# Patient Record
Sex: FEMALE | Race: WHITE | URBAN - NONMETROPOLITAN AREA
[De-identification: names, ages, dates, MRNs, and addresses within clinical notes are randomized per-mention and may not be internally consistent; named-entity substitution may affect disease eponyms.]

---

## 2017-01-27 PROBLEM — H40.013: Noted: 2017-01-27

## 2017-01-27 PROBLEM — H35.3221: Noted: 2017-01-27

## 2017-08-11 NOTE — PATIENT DISCUSSION
(C46.8549) Primary open-angle glaucoma right eye moderate stage - Assesment : Examination revealed Primary Open Angle Glaucoma. History of four SLT procedures OD and three SLT procedures OS, s/p YAG PI OU superiorly and patent today. Most recently Trabeculectomy OU. Blebs appear good today. - Plan : Monitor for changes. Advised patient to call our office when decreased vision or increased eye pain. Patient is currently on Latanoprost OU QHS, consider switching to Lumigan OU QHS to achieve better IOP control. Patient to begin using Lumigan OD QHS then RV 1 Month for Tension Check and VF 24-2.

## 2017-08-11 NOTE — PATIENT DISCUSSION
(H25.012) Cortical age-related cataract, left eye - Assesment : Examination revealed Cortical Senile Cataract with Pseudoexfoliation OS. Mild visual symptoms and okay to hold off on cat sx at this time. - Plan : Monitor for changes. Advised patient to call our office with decreased vision or increased symptoms. Explanation given. Patient to begin using Lumigan OD QHS then RV 1 Month for Tension Check and VF 24-2.

## 2017-08-11 NOTE — PATIENT DISCUSSION
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 Mild OD. - Plan : Monitor for changes. Advised patient to call our office with decreased vision or increased symptoms.

## 2017-08-11 NOTE — PATIENT DISCUSSION
(H35.362) Drusen (degenerative) of macula, left eye - Assesment : Examination revealed Drusen. Mild OS. - Plan : Monitor for changes. Advised patient to call our office with decreased vision or increased symptoms.

## 2017-08-11 NOTE — PATIENT DISCUSSION
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 History of four SLT procedures OD and three SLT procedures OS, s/p YAG PI OU superiorly and patent today. Most recently Trabeculectomy OU. Blebs appear good today. - Plan : Monitor for changes. Advised patient to call our office when decreased vision or increased eye pain. Patient is currently on Latanoprost OU QHS, consider switching to Lumigan OU QHS to achieve better IOP control. Patient to begin using Lumigan OD QHS then RV 1 Month for Tension Check and VF 24-2.

## 2017-09-22 NOTE — PATIENT DISCUSSION
(H25.012) Cortical age-related cataract, left eye - Assesment : Examination revealed Cortical Senile Cataract with Pseudoexfoliation OS. Mild visual symptoms and okay to hold off on cat sx at this time. - Plan : Monitor for changes. Advised patient to call our office with decreased vision or increased symptoms. Explanation given.

## 2017-09-22 NOTE — PATIENT DISCUSSION
(H40.012) Open angle with borderline findings, low risk, left eye - Assesment : Examination revealed suspicion for Open Angle Glaucoma Os. - Plan : Monitor for changes. Advised patient to call our office when decreased vision or increased eye pain.

## 2017-09-22 NOTE — PATIENT DISCUSSION
(K91.0885) Primary open-angle glaucoma right eye moderate stage - Assesment : Examination revealed Primary Open Angle Glaucoma. History of SLT OD x4 and SLT OS x3. IOP continues stable OU. VF OD - Inferior nasal step/OS - WNL. - Plan : Patient to continue Latanoprost QHS OD, instead of Lumigan due to cost and no improvement to IOP. Monitor for changes. Advised patient to call our office with decreased vision or an increase in symptoms. RV 4-6 months Exam/[facility] OCT.

## 2017-09-22 NOTE — PATIENT DISCUSSION
(H35.207) Drusen (degenerative) of macula, bilateral - Assesment : Examination revealed Drusen OU. - Plan : Monitor for changes. Advised patient to call our office with decreased vision or increased symptoms.

## 2018-04-19 NOTE — PATIENT DISCUSSION
(H25.012) Cortical age-related cataract, left eye - Assesment : Examination revealed Cortical Senile Cataract with Pseudoexfoliation OS. Mild visual symptoms and okay to hold off on cat sx at this time. - Plan : Monitor for changes. Advised patient to call our office with decreased vision or increased symptoms.

## 2018-04-19 NOTE — PATIENT DISCUSSION
(J94.7286) Primary open-angle glaucoma right eye moderate stage - Assesment : Examination revealed Primary Open Angle Glaucoma. History of SLT OD x4 and SLT OS x3. OCT nerve today suggests unchanged OU. IOP increase OD, possible due to scarring of BLEB, - Plan : Suggest to continue massaging the orbit area OD 2-3 times daily to see if it will lower IOP OD for a month. Continue Latanoprost QHS OD. May consider adding another drop to reduce IOP OD or refer to Dr Ashwin Jimenez for evaluation for revising BLEB OD if IOP does not lower. Monitor for changes. Advised patient to call our office with decreased vision or an increase in symptoms  RTC 1 month TN Check.

## 2018-05-25 NOTE — PATIENT DISCUSSION
(H25.012) Cortical age-related cataract, left eye - Assesment : Examination revealed Cortical Senile Cataract with Pseudoexfoliation OS. Patient is currently asymptomatic and functioning well. - Plan : Monitor for changes. Advised patient to call our office with decreased vision or increased symptoms.

## 2018-05-25 NOTE — PATIENT DISCUSSION
(M65.2175) Primary open-angle glaucoma right eye moderate stage - Assesment : Examination revealed Primary Open Angle Glaucoma. History of SLT OD x4 and SLT OS x3. - Plan : Continue massaging the orbit area OD 2-3 times daily to see if it will lower IOP OD for a month. Continue Latanoprost QHS OD. Monitor for changes. Advised patient to call our office with decreased vision or an increase in symptoms  RTC 6 month TN Check/OCT nerve.

## 2018-05-25 NOTE — PATIENT DISCUSSION
(H35.363) Drusen (degenerative) of macula, bilateral - Assesment : Examination revealed few fine Drusen OU. - Plan : Monitor for changes. Advised patient to call our office with decreased vision or increased symptoms.

## 2018-11-15 NOTE — PATIENT DISCUSSION
(O63.7056) Primary open-angle glaucoma right eye moderate stage - Assesment : Examination revealed Primary Open Angle Glaucoma. History of SLT OD x4 and SLT OS x3. OCT nerve unchanged OU from previous scans - Plan : Continue massaging the orbit area OD 2-3 times daily to see if it will lower IOP OD for a month. Stop  Latanoprost QHS OD. Start Vyzulta QHS OD, (samples given). Monitor for changes.  Advised patient to call our office with decreased vision or an increase in symptoms  RTC 5-6 weeks  TN Check

## 2018-11-15 NOTE — PATIENT DISCUSSION
(Z34.142) Keratoconjunct sicca, not specified as Sjogren's, bilateral - Assesment : Examination revealed Dry Eye Syndrome - Plan : Monitor for changes. Advised patient to call our office with decreased vision or increased symptoms.

## 2019-01-11 NOTE — PATIENT DISCUSSION
(V85.0997) Primary open-angle glaucoma right eye moderate stage - Assesment : Examination revealed Primary Open Angle Glaucoma. History of SLT OD x4 and SLT OS x3. IOP stable on Vyzulta. - Plan : Continue Vyzulta QHS OD, (samples given). Discount cards given. Monitor for changes.  Advised patient to call our office with decreased vision or an increase in symptoms  RTC 4 month TN CK/OCT nerve

## 2019-04-24 ENCOUNTER — IMPORTED ENCOUNTER (OUTPATIENT)
Dept: URBAN - NONMETROPOLITAN AREA CLINIC 1 | Facility: CLINIC | Age: 84
End: 2019-04-24

## 2019-04-24 PROCEDURE — 92012 INTRM OPH EXAM EST PATIENT: CPT

## 2019-04-24 NOTE — PATIENT DISCUSSION
*AMD:.-  Discussed findings of exam in detail with the patient. -  discussed the chronic nature of this disease and limited treatment options. -  recommended no smoking. WET OS KEEP APPT. WITH DR. PERRY*GLAUCOMA SUSPECT/OCULAR HYPERTENSION:.-  Discussed findings of exam in detail with the patient. -  discussed the risk of glaucoma and the importance of monitoring for this disease. BORDERLINE CUPPINGstable iop today; Dr's Notes: Appt with Dr. Nima Raymundo 9-27-13 @[facility]

## 2019-05-10 NOTE — PATIENT DISCUSSION
(G38.7047) Primary open-angle glaucoma right eye moderate stage - Assesment : Examination revealed Primary Open Angle Glaucoma. History of SLT OD x4 and SLT OS x3. IOP stable on Vyzulta. OCT NFL today shows OD- superior and inferior temporal thinning -unchanged, OS- Nasal inferior thinning-unchanged - Plan : Continue Vyzulta QHS OD, (samples given). Monitor for changes.  Advised patient to call our office with decreased vision or an increase in symptoms  RTC 6 month TN CK/VF 24-2

## 2022-04-09 ASSESSMENT — VISUAL ACUITY
OD_SC: 20/25
OS_CC: J1
OD_CC: J1
OS_SC: 20/40

## 2022-04-09 ASSESSMENT — TONOMETRY
OS_IOP_MMHG: 14
OD_IOP_MMHG: 14